# Patient Record
Sex: MALE | Race: WHITE
[De-identification: names, ages, dates, MRNs, and addresses within clinical notes are randomized per-mention and may not be internally consistent; named-entity substitution may affect disease eponyms.]

---

## 2023-08-05 ENCOUNTER — HOSPITAL ENCOUNTER (EMERGENCY)
Dept: HOSPITAL 95 - ER | Age: 63
Discharge: HOME | End: 2023-08-05
Payer: OTHER GOVERNMENT

## 2023-08-05 VITALS — SYSTOLIC BLOOD PRESSURE: 169 MMHG | DIASTOLIC BLOOD PRESSURE: 98 MMHG

## 2023-08-05 VITALS — HEIGHT: 67 IN | WEIGHT: 275 LBS | BODY MASS INDEX: 43.16 KG/M2

## 2023-08-05 DIAGNOSIS — Z79.1: ICD-10-CM

## 2023-08-05 DIAGNOSIS — Z79.899: ICD-10-CM

## 2023-08-05 DIAGNOSIS — I10: ICD-10-CM

## 2023-08-05 DIAGNOSIS — Z79.4: ICD-10-CM

## 2023-08-05 DIAGNOSIS — Z79.82: ICD-10-CM

## 2023-08-05 DIAGNOSIS — Z51.81: ICD-10-CM

## 2023-08-05 DIAGNOSIS — E11.40: Primary | ICD-10-CM

## 2023-08-05 DIAGNOSIS — Z79.2: ICD-10-CM

## 2024-10-06 ENCOUNTER — HOSPITAL ENCOUNTER (EMERGENCY)
Dept: HOSPITAL 95 - ER | Age: 64
Discharge: HOME | End: 2024-10-06
Payer: OTHER GOVERNMENT

## 2024-10-06 VITALS — BODY MASS INDEX: 43.39 KG/M2 | HEIGHT: 66 IN | WEIGHT: 270 LBS

## 2024-10-06 VITALS — DIASTOLIC BLOOD PRESSURE: 86 MMHG | SYSTOLIC BLOOD PRESSURE: 143 MMHG

## 2024-10-06 DIAGNOSIS — I10: ICD-10-CM

## 2024-10-06 DIAGNOSIS — Z79.4: ICD-10-CM

## 2024-10-06 DIAGNOSIS — G51.0: ICD-10-CM

## 2024-10-06 DIAGNOSIS — E11.42: Primary | ICD-10-CM

## 2024-10-06 DIAGNOSIS — Z79.899: ICD-10-CM

## 2024-10-06 DIAGNOSIS — E11.65: ICD-10-CM

## 2024-10-06 DIAGNOSIS — Z79.82: ICD-10-CM

## 2025-02-19 ENCOUNTER — HOSPITAL ENCOUNTER (INPATIENT)
Dept: HOSPITAL 95 - ER | Age: 65
LOS: 2 days | Discharge: HOME | DRG: 193 | End: 2025-02-21
Attending: FAMILY MEDICINE | Admitting: STUDENT IN AN ORGANIZED HEALTH CARE EDUCATION/TRAINING PROGRAM
Payer: OTHER GOVERNMENT

## 2025-02-19 VITALS — DIASTOLIC BLOOD PRESSURE: 90 MMHG | SYSTOLIC BLOOD PRESSURE: 147 MMHG

## 2025-02-19 VITALS — SYSTOLIC BLOOD PRESSURE: 144 MMHG | DIASTOLIC BLOOD PRESSURE: 89 MMHG

## 2025-02-19 VITALS — SYSTOLIC BLOOD PRESSURE: 140 MMHG | DIASTOLIC BLOOD PRESSURE: 78 MMHG

## 2025-02-19 VITALS — BODY MASS INDEX: 44.15 KG/M2 | WEIGHT: 281.31 LBS | HEIGHT: 67 IN

## 2025-02-19 VITALS — SYSTOLIC BLOOD PRESSURE: 127 MMHG | DIASTOLIC BLOOD PRESSURE: 65 MMHG

## 2025-02-19 VITALS — DIASTOLIC BLOOD PRESSURE: 81 MMHG | SYSTOLIC BLOOD PRESSURE: 145 MMHG

## 2025-02-19 VITALS — SYSTOLIC BLOOD PRESSURE: 124 MMHG | DIASTOLIC BLOOD PRESSURE: 75 MMHG

## 2025-02-19 VITALS — SYSTOLIC BLOOD PRESSURE: 160 MMHG | DIASTOLIC BLOOD PRESSURE: 93 MMHG

## 2025-02-19 DIAGNOSIS — J18.9: Primary | ICD-10-CM

## 2025-02-19 DIAGNOSIS — J44.0: ICD-10-CM

## 2025-02-19 DIAGNOSIS — Z28.29: ICD-10-CM

## 2025-02-19 DIAGNOSIS — Z79.82: ICD-10-CM

## 2025-02-19 DIAGNOSIS — Z87.891: ICD-10-CM

## 2025-02-19 DIAGNOSIS — G89.29: ICD-10-CM

## 2025-02-19 DIAGNOSIS — J44.1: ICD-10-CM

## 2025-02-19 DIAGNOSIS — L40.9: ICD-10-CM

## 2025-02-19 DIAGNOSIS — Z98.890: ICD-10-CM

## 2025-02-19 DIAGNOSIS — G51.0: ICD-10-CM

## 2025-02-19 DIAGNOSIS — F11.20: ICD-10-CM

## 2025-02-19 DIAGNOSIS — Z79.899: ICD-10-CM

## 2025-02-19 DIAGNOSIS — E66.9: ICD-10-CM

## 2025-02-19 DIAGNOSIS — I11.0: ICD-10-CM

## 2025-02-19 DIAGNOSIS — J96.01: ICD-10-CM

## 2025-02-19 DIAGNOSIS — I48.91: ICD-10-CM

## 2025-02-19 DIAGNOSIS — Z79.811: ICD-10-CM

## 2025-02-19 DIAGNOSIS — E11.40: ICD-10-CM

## 2025-02-19 DIAGNOSIS — Z79.4: ICD-10-CM

## 2025-02-19 DIAGNOSIS — I50.21: ICD-10-CM

## 2025-02-19 DIAGNOSIS — Z98.1: ICD-10-CM

## 2025-02-19 LAB
ALBUMIN SERPL BCP-MCNC: 4.1 G/DL (ref 3.4–5)
ALBUMIN/GLOB SERPL: 1 {RATIO} (ref 0.8–1.8)
ALT SERPL W P-5'-P-CCNC: 27 U/L (ref 12–78)
ANION GAP SERPL CALCULATED.4IONS-SCNC: 13 MMOL/L (ref 3–11)
AST SERPL W P-5'-P-CCNC: 17 U/L (ref 12–37)
BASOPHILS # BLD AUTO: 0.16 K/MM3 (ref 0–0.23)
BASOPHILS NFR BLD AUTO: 1 % (ref 0–2)
BILIRUB SERPL-MCNC: 0.4 MG/DL (ref 0.1–1)
BUN SERPL-MCNC: 28 MG/DL (ref 8–24)
CALCIUM SERPL-MCNC: 9.5 MG/DL (ref 8.5–10.1)
CHLORIDE SERPL-SCNC: 108 MMOL/L (ref 98–108)
CO2 SERPL-SCNC: 21 MMOL/L (ref 21–32)
CREAT SERPL-MCNC: 1.04 MG/DL (ref 0.6–1.2)
DEPRECATED RDW RBC AUTO: 50.7 FL (ref 35.1–46.3)
EOSINOPHIL # BLD AUTO: 0.41 K/MM3 (ref 0–0.68)
EOSINOPHIL NFR BLD AUTO: 3 % (ref 0–6)
ERYTHROCYTE [DISTWIDTH] IN BLOOD BY AUTOMATED COUNT: 15.3 % (ref 11.7–14.2)
FLUAV RNA SPEC QL NAA+PROBE: NEGATIVE
FLUBV RNA SPEC QL NAA+PROBE: NEGATIVE
GLOBULIN SER CALC-MCNC: 4.1 G/DL (ref 2.2–4)
GLUCOSE SERPL-MCNC: 201 MG/DL (ref 70–99)
HCT VFR BLD AUTO: 49.8 % (ref 37–53)
HGB BLD-MCNC: 16.1 G/DL (ref 13.5–17.5)
IMM GRANULOCYTES # BLD AUTO: 0.09 K/MM3 (ref 0–0.1)
IMM GRANULOCYTES NFR BLD AUTO: 1 % (ref 0–1)
LYMPHOCYTES # BLD AUTO: 6.63 K/MM3 (ref 0.84–5.2)
LYMPHOCYTES NFR BLD AUTO: 41 % (ref 21–46)
MCHC RBC AUTO-ENTMCNC: 32.3 G/DL (ref 31.5–36.5)
MCV RBC AUTO: 91 FL (ref 80–100)
MONOCYTES # BLD AUTO: 1.27 K/MM3 (ref 0.16–1.47)
MONOCYTES NFR BLD AUTO: 8 % (ref 4–13)
NEUTROPHILS # BLD AUTO: 7.75 K/MM3 (ref 1.96–9.15)
NEUTROPHILS NFR BLD AUTO: 48 % (ref 41–73)
NRBC # BLD AUTO: 0 K/MM3 (ref 0–0.02)
NRBC BLD AUTO-RTO: 0 /100 WBC (ref 0–0.2)
PH BLDV: 7.37 [PH] (ref 7.34–7.37)
PLATELET # BLD AUTO: 267 K/MM3 (ref 150–400)
POTASSIUM SERPL-SCNC: 4.9 MMOL/L (ref 3.5–5.5)
PROT SERPL-MCNC: 8.2 G/DL (ref 6.4–8.2)
RSV RNA SPEC QL NAA+PROBE: NEGATIVE
SARS-COV-2 RNA RESP QL NAA+PROBE: NEGATIVE
SODIUM SERPL-SCNC: 137 MMOL/L (ref 136–145)

## 2025-02-19 PROCEDURE — 5A09457 ASSISTANCE WITH RESPIRATORY VENTILATION, 24-96 CONSECUTIVE HOURS, CONTINUOUS POSITIVE AIRWAY PRESSURE: ICD-10-PCS | Performed by: FAMILY MEDICINE

## 2025-02-19 PROCEDURE — A9270 NON-COVERED ITEM OR SERVICE: HCPCS

## 2025-02-19 NOTE — NUR
ASSUMPTION OF CARE:
PATIENT IS ALERT AND ORIENTED X 4 ANXIOUS, ABLE TO MAKE NEEDS KNOWN,
CALLS APPROPRIATELY, DENIES CHEST PAIN OR PRESSURE. CURRENLTY ON 2L VIA
NC SPO2 >95%. PATIENT HAS BIPAP AT BEDSIDE. CURRENLTY 'S UP 
WITH EXERTION, PATIENT ENDORSES PAIN PRN MEDICATION GIVEN SEE MAR.
PATIENT DIURESING WELL. OSTOMY COVERED AT THIS TIME WITH PATIENT
REINFORCEMENT, DECLINES CHANGE. SPUTUM CULTURE NEEDS TO BE COLLECTED. DR. SCHMITT ROUNDED ON PATIENT, ECHO TO BE PERFORMED. NO ACUTE CONCERNS FROM
PATIENT OF THIS RN AT THIS TIME

## 2025-02-19 NOTE — NUR
EOS:
PATIENT ON AND OFF THE BIPAP THROUGH THE SHIFT, 14/7 30%. OR 2L NC FOR
BREAKS. HAS BEEN DIURESING WELL, INCREASED PAIN MANAGED WITH EMAR
MEDICATIONS CURRENTLY, ECHO THIS AM RESULTED. 1 LARGE FORMED STOOL VIA
COLOSTOMY. SOB AT TIMES, IMPROVES WITH BIPAP. EDUCATED ON NEED, AND
POTENTIAL FLUID RESTRICTION. DENIES CHEST PAIN PRESSURE. NEW BP AND HEART
MEDCIATIONS STARTED BY DR. SCHMITT, CHANGES TO INSULIN ADMINISTRATION.
PLAN OF CARE CONTINUES

## 2025-02-20 VITALS — SYSTOLIC BLOOD PRESSURE: 139 MMHG | DIASTOLIC BLOOD PRESSURE: 88 MMHG

## 2025-02-20 VITALS — DIASTOLIC BLOOD PRESSURE: 97 MMHG | SYSTOLIC BLOOD PRESSURE: 136 MMHG

## 2025-02-20 VITALS — DIASTOLIC BLOOD PRESSURE: 60 MMHG | SYSTOLIC BLOOD PRESSURE: 103 MMHG

## 2025-02-20 VITALS — SYSTOLIC BLOOD PRESSURE: 108 MMHG | DIASTOLIC BLOOD PRESSURE: 65 MMHG

## 2025-02-20 VITALS — SYSTOLIC BLOOD PRESSURE: 126 MMHG | DIASTOLIC BLOOD PRESSURE: 84 MMHG

## 2025-02-20 VITALS — SYSTOLIC BLOOD PRESSURE: 121 MMHG | DIASTOLIC BLOOD PRESSURE: 73 MMHG

## 2025-02-20 VITALS — SYSTOLIC BLOOD PRESSURE: 139 MMHG | DIASTOLIC BLOOD PRESSURE: 93 MMHG

## 2025-02-20 LAB
ALBUMIN SERPL BCP-MCNC: 3.7 G/DL (ref 3.4–5)
ALBUMIN/GLOB SERPL: 0.9 {RATIO} (ref 0.8–1.8)
ALT SERPL W P-5'-P-CCNC: 24 U/L (ref 12–78)
ANION GAP SERPL CALCULATED.4IONS-SCNC: 11 MMOL/L (ref 3–11)
AST SERPL W P-5'-P-CCNC: 12 U/L (ref 12–37)
BASOPHILS # BLD AUTO: 0.01 K/MM3 (ref 0–0.23)
BASOPHILS NFR BLD AUTO: 0 % (ref 0–2)
BILIRUB SERPL-MCNC: 0.5 MG/DL (ref 0.1–1)
BUN SERPL-MCNC: 37 MG/DL (ref 8–24)
CALCIUM SERPL-MCNC: 9.1 MG/DL (ref 8.5–10.1)
CHLORIDE SERPL-SCNC: 104 MMOL/L (ref 98–108)
CO2 SERPL-SCNC: 24 MMOL/L (ref 21–32)
CREAT SERPL-MCNC: 1.04 MG/DL (ref 0.6–1.2)
DEPRECATED RDW RBC AUTO: 49.7 FL (ref 35.1–46.3)
EOSINOPHIL # BLD AUTO: 0.01 K/MM3 (ref 0–0.68)
EOSINOPHIL NFR BLD AUTO: 0 % (ref 0–6)
ERYTHROCYTE [DISTWIDTH] IN BLOOD BY AUTOMATED COUNT: 15.1 % (ref 11.7–14.2)
GLOBULIN SER CALC-MCNC: 4 G/DL (ref 2.2–4)
GLUCOSE SERPL-MCNC: 277 MG/DL (ref 70–99)
HCT VFR BLD AUTO: 45.8 % (ref 37–53)
HGB BLD-MCNC: 14.9 G/DL (ref 13.5–17.5)
IMM GRANULOCYTES # BLD AUTO: 0.08 K/MM3 (ref 0–0.1)
IMM GRANULOCYTES NFR BLD AUTO: 1 % (ref 0–1)
LYMPHOCYTES # BLD AUTO: 1.21 K/MM3 (ref 0.84–5.2)
LYMPHOCYTES NFR BLD AUTO: 10 % (ref 21–46)
MAGNESIUM SERPL-MCNC: 2.5 MG/DL (ref 1.6–2.4)
MCHC RBC AUTO-ENTMCNC: 32.5 G/DL (ref 31.5–36.5)
MCV RBC AUTO: 89 FL (ref 80–100)
MONOCYTES # BLD AUTO: 0.62 K/MM3 (ref 0.16–1.47)
MONOCYTES NFR BLD AUTO: 5 % (ref 4–13)
NEUTROPHILS # BLD AUTO: 10.1 K/MM3 (ref 1.96–9.15)
NEUTROPHILS NFR BLD AUTO: 84 % (ref 41–73)
NRBC # BLD AUTO: 0 K/MM3 (ref 0–0.02)
NRBC BLD AUTO-RTO: 0 /100 WBC (ref 0–0.2)
PLATELET # BLD AUTO: 239 K/MM3 (ref 150–400)
POTASSIUM SERPL-SCNC: 4.6 MMOL/L (ref 3.5–5.5)
PROT SERPL-MCNC: 7.7 G/DL (ref 6.4–8.2)
SODIUM SERPL-SCNC: 134 MMOL/L (ref 136–145)

## 2025-02-20 NOTE — NUR
SHIFT SUMMARY
PT REMAINS A&OX4. VSS ON 2L NC WITH BIPAP HS & PRN. SATING >92%. PT STATES HE
IS CLAUSTROPHOBIC AND CANNOT USE BIPAP AS OFTEN. PERIODS OF REST GIVEN
THROUGHOUT NIGHT. PT C/O 9/10 IN BLE, FREQUENTLY, GIVEN PRNs PER MAR WITH GOOD
EFFECT. GETS OOB WITH SBA, PIVOTS FROM CHAIR TO BED. ABX GIVEN PER MAR. NO
FURTHER QUESTIONS OR CONCERNS AT THIS TIME. WILL CONTINUE WITH PLAN OF CARE
AND REPORT TO ONCOMING DAY SHIFT NURSE.

## 2025-02-21 VITALS — DIASTOLIC BLOOD PRESSURE: 84 MMHG | SYSTOLIC BLOOD PRESSURE: 139 MMHG

## 2025-02-21 VITALS — DIASTOLIC BLOOD PRESSURE: 72 MMHG | SYSTOLIC BLOOD PRESSURE: 136 MMHG

## 2025-02-21 VITALS — DIASTOLIC BLOOD PRESSURE: 64 MMHG | SYSTOLIC BLOOD PRESSURE: 133 MMHG

## 2025-02-21 LAB
ALBUMIN SERPL BCP-MCNC: 4 G/DL (ref 3.4–5)
ANION GAP SERPL CALCULATED.4IONS-SCNC: 12 MMOL/L (ref 3–11)
BUN SERPL-MCNC: 44 MG/DL (ref 8–24)
CALCIUM SERPL-MCNC: 9.6 MG/DL (ref 8.5–10.1)
CHLORIDE SERPL-SCNC: 104 MMOL/L (ref 98–108)
CO2 SERPL-SCNC: 22 MMOL/L (ref 21–32)
CREAT SERPL-MCNC: 0.94 MG/DL (ref 0.6–1.2)
GLUCOSE SERPL-MCNC: 236 MG/DL (ref 70–99)
MAGNESIUM SERPL-MCNC: 2.7 MG/DL (ref 1.6–2.4)
PHOSPHATE SERPL-MCNC: 4.5 MG/DL (ref 2.5–4.9)
POTASSIUM SERPL-SCNC: 4.4 MMOL/L (ref 3.5–5.5)
SODIUM SERPL-SCNC: 134 MMOL/L (ref 136–145)

## 2025-02-21 NOTE — NUR
am note
 
this rn assumed care at 0700. vital signs stable. tele sinus rhythm 90s.
 
patient is alert and oriented x4. perrla. patient is independent in the room
and uses cane at baseline. patient is independent in adls and manages his own
ostomy bag. patient reports pain in left knee and rated at 9, and medicated
per emar. upon reassessment patient reported pain remains at 9, and this rn
gave iv fentanyl and upon reassessment patient pain level at 5. see shift
assessment for further pain.
 
patient had a home oxygen eval and passed. patient does not need home oxygen.
this rn called md nguyen and updated on this.
 
md nguyen saw patient this am and plan to go home this afternoon.

## 2025-02-21 NOTE — NUR
SHIFT SUMMARY
 
PT A&O X4, OBEYS COMMANDS, MOVING ALL EXTREMITIES WITH PURPOSE, HOLDING
APPROPRIATE CONVERSATION, PT IND IN ROOM URING CANE/EDUCATED TO CALL BEFORE
AMBULATING.
CONTINUOUS SPO2. SPO2 GREATER THAN 90% ON RA, NO SIGNS OF RESPIRATORY DISTRESS
NOTED.
CONTINUOUS TELE MONITORING, SINUS RHYTM  S, BP STABLE WITH MAP GREATER
THAN 65, PT DENIES CHEST P/P T/O THIS SHIFT, PT DID REPORT FEELINGS OF HEART
BURN THAT SUBSIDED WITH TUMBS, STRONG PULSES T/O.
BOWEL TONES PRESENT IN ALL 4Q, OSTOMY DRESSING C/D/I, STOMA RED/BEEFY/MOSIT,
PT REPORTING CONCERN OF NOT HAVING A BM THIS NIGHT 02/20 AS HE USUALLY HAS A
BM EVERY DAY, PT DENIES FEELINGS OF CONSTIPATION.
PT USING URINAL IND.
PT REPORTING NEUROPTHY PAIN IN BLE STATING HIS FEET "FEEL LIKE WOOD AND BECOME
STIFF" PT REPORTS HAVING TIRED NEUROPATHY MEDICATION IN THE PAST AND HAVE HAD
POOR REACTIONS TO THEM, MEDICATED PT PER ORDERS.
BED LOWEST POSITION, CALL LIGHT IN REACH, AWAITING TO GIVE REPORT TO ONCOMING
RN.

## 2025-02-21 NOTE — NUR
DISCHARGED SUMMARY:
PT DISCHARGED TO HOME AT THIS TIME BY THIS RN. PT WAS GIVEN WRITTEN AND VERBAL
DC INSTRUCTIOND. PT DENIES ANY FURTHER QUESTIONS OR CONCERNS. IV WAS REMOVED
WITH CATHETER INTACT. PT WAS WHEELED OUT BY BRYCE VIA WHEELCHAIR.